# Patient Record
Sex: MALE | Race: WHITE
[De-identification: names, ages, dates, MRNs, and addresses within clinical notes are randomized per-mention and may not be internally consistent; named-entity substitution may affect disease eponyms.]

---

## 2018-08-29 ENCOUNTER — HOSPITAL ENCOUNTER (EMERGENCY)
Dept: HOSPITAL 74 - JER | Age: 38
LOS: 1 days | Discharge: HOME | End: 2018-08-30
Payer: COMMERCIAL

## 2018-08-29 VITALS — SYSTOLIC BLOOD PRESSURE: 132 MMHG | HEART RATE: 86 BPM | DIASTOLIC BLOOD PRESSURE: 82 MMHG | TEMPERATURE: 99 F

## 2018-08-29 VITALS — BODY MASS INDEX: 28.8 KG/M2

## 2018-08-29 DIAGNOSIS — N20.0: Primary | ICD-10-CM

## 2018-08-29 LAB
ALBUMIN SERPL-MCNC: 4.5 G/DL (ref 3.4–5)
ALP SERPL-CCNC: 78 U/L (ref 45–117)
ALT SERPL-CCNC: 51 U/L (ref 12–78)
ANION GAP SERPL CALC-SCNC: 9 MMOL/L (ref 8–16)
APPEARANCE UR: CLEAR
AST SERPL-CCNC: 32 U/L (ref 15–37)
BACTERIA #/AREA URNS HPF: (no result) /HPF
BASOPHILS # BLD: 0.5 % (ref 0–2)
BILIRUB SERPL-MCNC: 0.4 MG/DL (ref 0.2–1)
BILIRUB UR STRIP.AUTO-MCNC: NEGATIVE MG/DL
BUN SERPL-MCNC: 17 MG/DL (ref 7–18)
CALCIUM SERPL-MCNC: 9.1 MG/DL (ref 8.5–10.1)
CHLORIDE SERPL-SCNC: 105 MMOL/L (ref 98–107)
CO2 SERPL-SCNC: 28 MMOL/L (ref 21–32)
COLOR UR: YELLOW
CREAT SERPL-MCNC: 1.4 MG/DL (ref 0.7–1.3)
DEPRECATED RDW RBC AUTO: 13.5 % (ref 11.9–15.9)
EOSINOPHIL # BLD: 2.7 % (ref 0–4.5)
GLUCOSE SERPL-MCNC: 108 MG/DL (ref 74–106)
HCT VFR BLD CALC: 40.4 % (ref 35.4–49)
HGB BLD-MCNC: 13.7 GM/DL (ref 11.7–16.9)
INR BLD: 0.98 (ref 0.83–1.09)
KETONES UR QL STRIP: NEGATIVE
LEUKOCYTE ESTERASE UR QL STRIP.AUTO: NEGATIVE
LYMPHOCYTES # BLD: 15.3 % (ref 8–40)
MCH RBC QN AUTO: 33.8 PG (ref 25.7–33.7)
MCHC RBC AUTO-ENTMCNC: 33.9 G/DL (ref 32–35.9)
MCV RBC: 99.6 FL (ref 80–96)
MONOCYTES # BLD AUTO: 5.1 % (ref 3.8–10.2)
NEUTROPHILS # BLD: 76.4 % (ref 42.8–82.8)
NITRITE UR QL STRIP: NEGATIVE
PH UR: 5 [PH] (ref 5–8)
PLATELET # BLD AUTO: 285 K/MM3 (ref 134–434)
PMV BLD: 9.2 FL (ref 7.5–11.1)
POTASSIUM SERPLBLD-SCNC: 4.2 MMOL/L (ref 3.5–5.1)
PROT SERPL-MCNC: 8 G/DL (ref 6.4–8.2)
PROT UR QL STRIP: (no result)
PROT UR QL STRIP: NEGATIVE
PT PNL PPP: 11.1 SEC (ref 9.7–13)
RBC # BLD AUTO: 4.06 M/MM3 (ref 4–5.6)
SODIUM SERPL-SCNC: 142 MMOL/L (ref 136–145)
SP GR UR: 1.02 (ref 1–1.03)
UROBILINOGEN UR STRIP-MCNC: NEGATIVE MG/DL (ref 0.2–1)
WBC # BLD AUTO: 9.9 K/MM3 (ref 4–10)

## 2018-08-29 PROCEDURE — 3E033NZ INTRODUCTION OF ANALGESICS, HYPNOTICS, SEDATIVES INTO PERIPHERAL VEIN, PERCUTANEOUS APPROACH: ICD-10-PCS

## 2018-08-29 PROCEDURE — 3E0337Z INTRODUCTION OF ELECTROLYTIC AND WATER BALANCE SUBSTANCE INTO PERIPHERAL VEIN, PERCUTANEOUS APPROACH: ICD-10-PCS

## 2018-08-29 NOTE — PDOC
Attending Attestation





- HPI


HPI: 





08/29/18 20:26


The patient is a 37 year old male, with no significant past medical history, 

who presents to the emergency department with right upper quadrant pain after 

eating rice and beans this evening. He reports his pain associated with nausea 

and one episode of nonbloody emesis. 





The patient denies chest pain, shortness of breath, headache and dizziness. The 

patient denies fever, chills, diarrhea and constipation. The patient denies 

dysuria, frequency, urgency and hematuria. 





Allergies: NKDA


Past surgical history: none reported


Social history: denies tobacco and ETOH








- Physicial Exam


PE: 





08/29/18 22:18


GENERAL:


Well developed, well nourished. Awake and alert. No acute distress.


HEENT:


Normocephalic, atraumatic. PERRLA, EOMI. No conjunctival pallor. Sclera are non-

icteric. Moist mucous membranes. Oropharynx is clear.


NECK: 


Supple. Full ROM. No JVD. Carotid pulses 2+ and symmetric, without bruits. No 

thyromegaly. No lymphadenopathy.


CARDIOVASCULAR:


Regular rate and rhythm. No murmurs, rubs, or gallops. Distal pulses are 2+ and 

symmetric. 


PULMONARY: 


No evidence of respiratory distress. Lungs clear to auscultation bilaterally. 

No wheezing, rales or rhonchi.


ABDOMINAL:


Soft. Non-tender. Non-distended. No rebound or guarding. No organomegaly. 

Normoactive bowel sounds. 


MUSCULOSKELETAL 


Normal range of motion at all joints. No bony deformities or tenderness. No CVA 

tenderness.


EXTREMITIES: 


No cyanosis. No clubbing. No edema. No calf tenderness.


SKIN: 


Warm and dry. Normal capillary refill. No rashes. No jaundice. 


NEUROLOGICAL: 


Alert, awake, appropriate. Cranial nerves 2-12 intact. Normoreflexic in the 

upper and lower extremities. Normal speech. Toes are down-going bilaterally. 

Gait is normal without ataxia.


PSYCHIATRIC: 


Cooperative. Good eye contact. Appropriate mood and affect.





- Medical Decision Making


08/29/18 20:26


Documentation prepared by Fariba Avila, acting as medical scribe for Erik Valenzuela DO.





<Fariba Avila - Last Filed: 08/29/18 22:17>





- Resident


Resident Name: Curly Dubose





- ED Attending Attestation


I have performed the following: I have examined & evaluated the patient, The 

case was reviewed & discussed with the resident, I agree w/resident's findings 

& plan, Exceptions are as noted





- Medical Decision Making








08/29/18 23:39


Pt was treated and released





<Erik Valenzuela - Last Filed: 08/29/18 23:39>

## 2018-08-29 NOTE — PDOC
History of Present Illness





- General


Chief Complaint: Pain, Acute


Stated Complaint: PAIN, ACUTE


Time Seen by Provider: 08/29/18 18:58





- History of Present Illness


Initial Comments: 





08/29/18 20:54


The patient is a 37 year old male with no significant PMH who presents for 

evaluation of abdominal pain.  The patient reports experiencing RUQ crampy 

abdominal pain after eating earlier this evening with associated nausea and 1 

episode of non-bilious, non-bloody vomiting prompting his presentation to the 

ED for further evaluation.  He notes that his symptoms have somewhat improved 

on presentation to the ED, but otherwise denies fevers, chills, SOB, chest pain

, or changes with urination or bowel movements.





Past History





- Past Medical History


Allergies/Adverse Reactions: 


 Allergies











Allergy/AdvReac Type Severity Reaction Status Date / Time


 


No Known Allergies Allergy   Verified 08/29/18 18:59











Home Medications: 


Ambulatory Orders





No Home Medications 0 dose .ROUTE UTDICT 06/28/13 








COPD: No


Other medical history: DENIES.





- Suicide/Smoking/Psychosocial Hx


Smoking Status: Yes


Smoking History: Current some day smoker


Have you smoked in the past 12 months: Yes


Number of Cigarettes Smoked Daily: 5


Information on smoking cessation initiated: No





**Review of Systems





- Review of Systems


Comments:: 





08/29/18 20:57


Constitutional: No fevers, chills, fatigue, malaise


HEENT: No Rhinorrhea, nasal congestion, visual changes


Cardiovascular: No chest pain, syncope, palpitations, lightheadedness


Respiratory: No Cough, SOB, Hemoptysis,


Gastrointestinal: Abdominal pain, nausea, vomiting.  No Constipation, Diarrhea, 

Melena


Genitourinary: No Dysuria, Frequency, Urgency, Hesitancy, Hematuria, Flank pain


Musculoskeletal: No Myalgia, arthralgia


Skin: No rashes, itching, bruising, pallor


Neurologic: No Headache, Dizziness, Numbness, Weakness, or Tingling


Psychiatric: No Hallucinations. No SI or HI








*Physical Exam





- Vital Signs


 Last Vital Signs











Temp Pulse Resp BP Pulse Ox


 


 99 F   86   19   132/82   96 


 


 08/29/18 19:00  08/29/18 19:00  08/29/18 19:00  08/29/18 19:00  08/29/18 19:00














- Physical Exam


Comments: 





08/29/18 20:57


General Appearance: Nourished. No Apparent Distress


HEENT: No Pharyngeal Erythema, Tonsillar Exudate, Tonsillar Erythema


Neck: No Cervical Lymphadenopathy


Respiratory/Chest: Lungs Clear, Normal Breath Sounds. No Crackles, Rales, 

Rhonchi, Wheezing


Cardiovascular: Regular Rhythm, Regular Rate. No Murmur, Gallops, Rubs


Gastrointestinal/Abdominal: Normal Bowel Sounds, Soft. Mild RUQ tenderness to 

palpation on exam. No RLQ tenderness.  Negative Hernandez's sign.  No Guarding, 

Rebound, 


Musculoskeletal: No CVA Tenderness


Extremity: Normal Capillary Refill


Integumentary: Normal Color, Dry, Warm


Neurologic:  Fully Oriented, Alert, Normal Mood/Affect, Normal Response, 





ED Treatment Course





- LABORATORY


CBC & Chemistry Diagram: 


 08/29/18 19:37





 08/29/18 19:37





- ADDITIONAL ORDERS


Additional order review: 


 











  08/29/18





  19:37


 


RBC  4.06


 


MCV  99.6 H


 


MCHC  33.9


 


RDW  13.5


 


MPV  9.2


 


Neutrophils %  76.4  D


 


Lymphocytes %  15.3  D


 


Monocytes %  5.1


 


Eosinophils %  2.7


 


Basophils %  0.5














Medical Decision Making





- Medical Decision Making





08/29/18 20:58


The patient is a 37 year old male with no significant PMH who presents for 

evaluation of abdominal pain.  Differential includes but is not limited to: 

Cholecystitis, Pancreatitis, Infectious, Metabolic derangement.  Given the 

patient's history and physical exam, we will obtain a cbc, cmp, lipase, 

gallbladder US to evaluate further.  We will treat with iv fluids and iv 

tylenol and continue to monitor and reassess while here in the ED.





08/30/18 03:50


CBC, cmp, lipase are unremarkable.  Gallbladder US is unremarkable as read by 

our radiologist.  UA demonstrates elevated rbc.  Renal CT demonstrates 4mm 

kidney stone at the uretal-pelvic junction.  The patient's symptoms are likely 

due to a kidney stone.  He remains comfortable on exam.  We are comfortable 

discharging the patient home with urology follow up.  We discussed the results, 

plan, and return precautions with the patient who voiced understanding and is 

agreeable with the plan.





*DC/Admit/Observation/Transfer


Diagnosis at time of Disposition: 


 Kidney stone on right side





Abdominal pain


Qualifiers:


 Abdominal location: unspecified location Qualified Code(s): R10.9 - 

Unspecified abdominal pain








- Discharge Dispostion


Disposition: HOME


Condition at time of disposition: Stable


Decision to Admit order: No





- Referrals


Referrals: 


Chapin Paul MD., MD [Staff Physician] - 





- Patient Instructions


Printed Discharge Instructions:  DI for Kidney Stones


Additional Instructions: 


Please return to the ER if you experience concerning or worsening symptoms 

including worsening pain, vomiting or fevers.





Your lab results and CT scan show that you have a kidney stone.  Your kidney 

stone should pass on its own.  Please call to schedule a follow up appointment 

with our urologist within 2-3 days to discuss your ER visit and further 

management of your symptoms.





- Post Discharge Activity

## 2018-08-29 NOTE — PDOC
Rapid Medical Evaluation


Time Seen by Provider: 08/29/18 18:58


Medical Evaluation: 


 Allergies











Allergy/AdvReac Type Severity Reaction Status Date / Time


 


No Known Allergies Allergy   Verified 06/28/13 17:45











08/29/18 18:59


Pt presents with abdominal pain after eating. He states the pain is in his 

right side. Pain started an hour ago. Describes the pain as crampy. Admits to 

vomiting. Denies diarrhea, fevers.


Exam: TTP of the RLQ. No CVA tenderness


Orders: Labs, Urine, EKG, Iv insert


Pt to proceed to ED for further evaluation.





**Discharge Disposition





- Diagnosis


 Abdominal pain








- Referrals





- Patient Instructions





- Post Discharge Activity

## 2018-08-30 NOTE — EKG
Test Reason : 

Blood Pressure : ***/*** mmHG

Vent. Rate : 071 BPM     Atrial Rate : 071 BPM

   P-R Int : 162 ms          QRS Dur : 094 ms

    QT Int : 388 ms       P-R-T Axes : 060 054 039 degrees

   QTc Int : 421 ms

 

NORMAL SINUS RHYTHM WITH SINUS ARRHYTHMIA

NORMAL ECG

NO PREVIOUS ECGS AVAILABLE

Confirmed by FLETCHER BLANCAS, CLIFF ([id number]) on 8/30/2018 11:50:49 AM

 

Referred By:             Confirmed By:CLIFF BYNUM MD

## 2019-02-03 ENCOUNTER — HOSPITAL ENCOUNTER (EMERGENCY)
Dept: HOSPITAL 74 - FER | Age: 39
Discharge: HOME | End: 2019-02-03
Payer: COMMERCIAL

## 2019-02-03 VITALS — BODY MASS INDEX: 30.4 KG/M2

## 2019-02-03 VITALS — TEMPERATURE: 97.9 F | SYSTOLIC BLOOD PRESSURE: 138 MMHG | DIASTOLIC BLOOD PRESSURE: 79 MMHG | HEART RATE: 87 BPM

## 2019-02-03 DIAGNOSIS — F17.210: ICD-10-CM

## 2019-02-03 DIAGNOSIS — M54.5: Primary | ICD-10-CM

## 2019-02-03 PROCEDURE — 3E0233Z INTRODUCTION OF ANTI-INFLAMMATORY INTO MUSCLE, PERCUTANEOUS APPROACH: ICD-10-PCS | Performed by: EMERGENCY MEDICINE

## 2019-02-03 NOTE — PDOC
Attending Attestation





- Resident


Resident Name: Wilfredo Pennington





- ED Attending Attestation


I have performed the following: I have examined & evaluated the patient, The 

case was reviewed & discussed with the resident, I agree w/resident's findings 

& plan, Exceptions are as noted





- HPI


HPI: 





02/03/19 13:37


39 yo male with low back pain. started few days ago after lifting heavy object. 

no urinary complaints. no new weakness or numbness. no bowel or bladder 

incontinence.   no fever or chills. no urinary  complaints. pain lower back 

radiating to left . does not go down leg. worse wtih movement and bending.


02/03/19 14:06








- Physicial Exam


PE: 





02/03/19 14:09


awake alert lungs clear bilaterally heart rrr no mrg abd soft nt nd. ext wwp no 

edema. no mildline spinal tenderness. paraspinal lumbosacral spasm ,reginaldo left 

side. 5/5 lower ext strength with hip flex/ext, knee flex / ext. sensation 

intact lower ext. 





- Medical Decision Making





02/03/19 14:09


pt with low back strain, normal nuerological exam. no mildline tendernss. will 

treat with muscle relaxer and nsaid. told to followup with pcp for outpt pt .

## 2019-02-03 NOTE — PDOC
History of Present Illness





- General


Chief Complaint: Bone Injury


Stated Complaint: BACK PAIN


Time Seen by Provider: 02/03/19 13:12


History Source: Patient


Exam Limitations: No Limitations





- History of Present Illness


Initial Comments: 





02/03/19 13:30


Patient is a 38M with no significant medical history here today complaining of 

back pain that onset yesterday during heavy lifting. Patient took motrin with 

some relief yesterday but the pain returned today causing him to come into the 

ED. Denies fevers, chills, nausea, vomiting. Denies urinary symptoms, saddle 

anesthesia, foot drop, leg weakness, arm weakness, IDVA, cancer history. Denies 

chest pain, shortness of breath.





Past History





- Past Medical History


Allergies/Adverse Reactions: 


 Allergies











Allergy/AdvReac Type Severity Reaction Status Date / Time


 


No Known Allergies Allergy   Verified 02/03/19 13:06











Home Medications: 


Ambulatory Orders





Ibuprofen 400 mg PO PRN 02/03/19 


Lidocaine 5% Patch [Lidoderm -] 1 patch TP DAILY #7 patch 02/03/19 








COPD: No





- Immunization History


Immunization Up to Date: No





- Suicide/Smoking/Psychosocial Hx


Smoking Status: Yes


Smoking History: Current every day smoker


Have you smoked in the past 12 months: Yes


Number of Cigarettes Smoked Daily: 5


Information on smoking cessation initiated: Yes


Hx Alcohol Use: No


Drug/Substance Use Hx: No





**Review of Systems





- Review of Systems


Able to Perform ROS?: Yes


Comments:: 





02/03/19 13:32


GENERAL/CONSTITUTIONAL: No fever or chills. No weakness.


HEAD, EYES, EARS, NOSE AND THROAT: No change in vision. No sore throat.


CARDIOVASCULAR: No chest pain or shortness of breath


RESPIRATORY: No cough, wheezing, or hemoptysis.


GASTROINTESTINAL: No nausea, vomiting, diarrhea or constipation.


GENITOURINARY: No dysuria, frequency, or change in urination.


MUSCULOSKELETAL: No joint or muscle swelling or pain. No neck pain +back pain.


SKIN: No rash


NEUROLOGIC: No headache, vertigo, loss of consciousness, or change in strength/

sensation.








*Physical Exam





- Vital Signs


 Last Vital Signs











Temp Pulse Resp BP Pulse Ox


 


 97.9 F   87   20   138/79   97 


 


 02/03/19 13:05  02/03/19 13:05  02/03/19 13:05  02/03/19 13:05  02/03/19 13:05














- Physical Exam


Comments: 





02/03/19 13:38


GENERAL: Awake, alert, and fully oriented, in no acute distress


HEAD: No signs of trauma, normocephalic, atraumatic


BACK: No midline tenderness, +L lower lateral back pain with muscle spasm


EYES: PERRLA, EOMI, sclera anicteric, conjunctiva clear


ENT: Auricles normal inspection, hearing grossly normal, nares patent, 

oropharynx clear without


exudates. Moist mucosa


NECK: Normal ROM, supple, no lymphadenopathy, JVD, or masses


LUNGS: No distress, speaks full sentences, clear to auscultation bilaterally


HEART: Regular rate and rhythm, normal S1 and S2, no murmurs, rubs or gallops, 

peripheral pulses normal and equal bilaterally.


ABDOMEN: Soft, nontender, normoactive bowel sounds.  No guarding, no rebound.  

No masses


EXTREMITIES: Normal inspection, Normal range of motion, no edema.  No clubbing 

or cyanosis.


NEUROLOGICAL: Cranial nerves II through XII grossly intact.  Normal speech, 

normal gait, no focal sensorimotor deficits


SKIN: Warm, Dry, normal turgor, no rashes or lesions noted.








Moderate Sedation





- Procedure Monitoring


Vital Signs: 


Procedure Monitoring Vital Signs











Temperature  97.9 F   02/03/19 13:05


 


Pulse Rate  87   02/03/19 13:05


 


Respiratory Rate  20   02/03/19 13:05


 


Blood Pressure  138/79   02/03/19 13:05


 


O2 Sat by Pulse Oximetry (%)  97   02/03/19 13:05











ED Treatment Course





- Medications


Given in the ED: 


ED Medications














Discontinued Medications














Generic Name Dose Route Start Last Admin





  Trade Name Abimbola  PRN Reason Stop Dose Admin


 


Diazepam  5 mg  02/03/19 13:12  02/03/19 13:20





  Valium -  PO  02/03/19 13:13  5 mg





  ONCE ONE   Administration





     





     





     





     


 


Ketorolac Tromethamine  60 mg  02/03/19 13:12  02/03/19 13:22





  Toradol Injection -  IM  02/03/19 13:13  60 mg





  ONCE ONE   Administration





     





     





     





     


 


Lidocaine  1 patch  02/03/19 13:12  02/03/19 13:20





  Lidoderm Patch -  TP  02/03/19 13:13  1 patch





  ONCE ONE   Administration





     





     





     





     














Medical Decision Making





- Medical Decision Making





02/03/19 13:39


Patient is 38M here today with back pain. No red flags. Will treat with valium, 

toradol, lidocaine patches. Will reassess, likely discharge.


02/03/19 17:55


Patient improved soon after medication. Discharged. Given return precautions.








*DC/Admit/Observation/Transfer


Diagnosis at time of Disposition: 


 Back pain








- Discharge Dispostion


Disposition: HOME


Condition at time of disposition: Good


Decision to Admit order: No





- Prescriptions


Prescriptions: 


Lidocaine 5% Patch [Lidoderm -] 1 patch TP DAILY #7 patch





- Referrals





- Patient Instructions


Printed Discharge Instructions:  DI for Thoracic Back Pain


Additional Instructions: 


Please follow up with your primary care doctor this week.





Please return if you have any new, worsening or concerning symptoms, especially 

fevers, increasing pain and leg/foot weakness.





- Post Discharge Activity

## 2019-03-04 ENCOUNTER — HOSPITAL ENCOUNTER (EMERGENCY)
Dept: HOSPITAL 74 - FER | Age: 39
Discharge: HOME | End: 2019-03-04
Payer: COMMERCIAL

## 2019-03-04 VITALS — TEMPERATURE: 98.8 F | HEART RATE: 97 BPM | DIASTOLIC BLOOD PRESSURE: 80 MMHG | SYSTOLIC BLOOD PRESSURE: 133 MMHG

## 2019-03-04 VITALS — BODY MASS INDEX: 29.5 KG/M2

## 2019-03-04 DIAGNOSIS — M54.5: Primary | ICD-10-CM

## 2019-03-04 DIAGNOSIS — F17.210: ICD-10-CM

## 2019-03-04 LAB
ALBUMIN SERPL-MCNC: 4.5 G/DL (ref 3.4–5)
ALP SERPL-CCNC: 72 U/L (ref 45–117)
ALT SERPL-CCNC: 46 U/L (ref 13–61)
ANION GAP SERPL CALC-SCNC: 10 MMOL/L (ref 8–16)
AST SERPL-CCNC: 35 U/L (ref 15–37)
BASOPHILS # BLD: 1.3 % (ref 0–2)
BILIRUB SERPL-MCNC: 1.4 MG/DL (ref 0.2–1)
BUN SERPL-MCNC: 13 MG/DL (ref 7–18)
CALCIUM SERPL-MCNC: 9.5 MG/DL (ref 8.5–10)
CHLORIDE SERPL-SCNC: 100 MMOL/L (ref 98–107)
CO2 SERPL-SCNC: 24 MMOL/L (ref 21–32)
CREAT SERPL-MCNC: 1 MG/DL (ref 0.55–1.3)
DEPRECATED RDW RBC AUTO: 14.4 % (ref 11.9–15.9)
EOSINOPHIL # BLD: 5.5 % (ref 0–4.5)
EPITH CASTS URNS QL MICRO: (no result) /HPF
GLUCOSE SERPL-MCNC: 108 MG/DL (ref 74–106)
HCT VFR BLD CALC: 42 % (ref 35.4–49)
HGB BLD-MCNC: 13.5 GM/DL (ref 11.7–16.9)
LYMPHOCYTES # BLD: 26.6 % (ref 8–40)
MCH RBC QN AUTO: 33.3 PG (ref 25.7–33.7)
MCHC RBC AUTO-ENTMCNC: 32.1 G/DL (ref 32–35.9)
MCV RBC: 103.8 FL (ref 80–96)
MONOCYTES # BLD AUTO: 4.6 % (ref 3.8–10.2)
NEUTROPHILS # BLD: 62 % (ref 42.8–82.8)
PH UR: 5.5 [PH] (ref 4.5–8)
PLATELET # BLD AUTO: 335 K/MM3 (ref 134–434)
PMV BLD: 9.4 FL (ref 7.5–11.1)
POTASSIUM SERPLBLD-SCNC: 4.3 MMOL/L (ref 3.5–5.1)
PROT SERPL-MCNC: 7.7 G/DL (ref 6.4–8.2)
RBC # BLD AUTO: (no result) /HPF (ref 0–3)
RBC # BLD AUTO: 4.05 M/MM3 (ref 4–5.6)
SODIUM SERPL-SCNC: 134 MMOL/L (ref 136–145)
SP GR UR: 1.02 (ref 1.01–1.03)
SPERM # UR AUTO: (no result) /UL
UROBILINOGEN UR STRIP-MCNC: 0.2 MG/DL (ref 0.2–1)
WBC # BLD AUTO: 7.7 K/MM3 (ref 4–10.8)
WBC # UR AUTO: (no result) /UL (ref 0–2)

## 2019-03-04 PROCEDURE — 3E0233Z INTRODUCTION OF ANTI-INFLAMMATORY INTO MUSCLE, PERCUTANEOUS APPROACH: ICD-10-PCS | Performed by: EMERGENCY MEDICINE

## 2019-03-04 NOTE — PDOC
History of Present Illness





- General


Chief Complaint: Back Pain


Stated Complaint: BACK PAIN


Time Seen by Provider: 03/04/19 12:16


History Source: Patient


Exam Limitations: No Limitations





- History of Present Illness


Initial Comments: 





03/04/19 12:38


The patient is a 38M with no PMH who presents to the ER with complaints of low 

back pain. The patient states that 1 month ago, he was diagnosed with MSK low 

back pain. 1 week ago, he bent down to pick something up and felt a worsening 

of his low back pain. Today, he states that his "kidneys feel full". He denies 

numbness, tingling, weakness, fever, chills, bowel/bladder incontinence, hx of 

IVDA, and hx of cancer.





Past History





- Past Medical History


Allergies/Adverse Reactions: 


 Allergies











Allergy/AdvReac Type Severity Reaction Status Date / Time


 


No Known Allergies Allergy   Verified 03/04/19 12:15











Home Medications: 


Ambulatory Orders





NK [No Known Home Medication]  03/04/19 








COPD: No


Kidney Stones: Yes





- Immunization History


Immunization Up to Date: No





- Suicide/Smoking/Psychosocial Hx


Smoking Status: Yes


Smoking History: Current every day smoker


Have you smoked in the past 12 months: Yes


Number of Cigarettes Smoked Daily: 5


Information on smoking cessation initiated: Yes


'Breaking Loose' booklet given: 02/03/19


Hx Alcohol Use:  (occasional)


Drug/Substance Use Hx: No





**Review of Systems





- Review of Systems


Able to Perform ROS?: Yes


Comments:: 





03/04/19 12:49


GENERAL/CONSTITUTIONAL: No fever or chills. No weakness.


HEAD, EYES, EARS, NOSE AND THROAT: No change in vision. No ear pain or 

discharge. No sore throat.


CARDIOVASCULAR: No chest pain, palpitations, or lightheadedness.


RESPIRATORY: No cough, wheezing, shortness of breath, or hemoptysis.


GASTROINTESTINAL: No nausea, vomiting, diarrhea, constipation, or abdominal 

pain. 


GENITOURINARY: No dysuria, frequency, hematuria, or change in urination.


MUSCULOSKELETAL: + for low back pain. No joint or muscle swelling or pain. 


SKIN: No rash or lesions. 


NEUROLOGIC: No headache, numbness, tingling, focal weakness, loss of 

consciousness, or change in strength/sensation.





Is the patient limited English proficient: No





*Physical Exam





- Vital Signs


 Last Vital Signs











Temp Pulse Resp BP Pulse Ox


 


 98.8 F   97 H  18   133/80    


 


 03/04/19 12:14  03/04/19 12:14  03/04/19 12:14  03/04/19 12:14   














- Physical Exam


Comments: 





03/04/19 12:49


GENERAL: Well developed, well nourished. Awake and alert. No acute distress.


HEENT: Normocephalic, atraumatic. Hearing grossly normal. Moist mucous 

membranes. PERRLA, EOMI. No conjunctival pallor. Sclera are non-icteric. 

Oropharynx is clear.


NECK: Supple. Full ROM. No JVD. 


GENITOURINARY: No CVA tenderness bilaterally.


MUSCULOSKELETAL: Normal range of motion at all joints. No bony deformities or 

tenderness. 


EXTREMITIES: No cyanosis. No clubbing. No edema. No calf tenderness or swelling.


SKIN: Warm and dry. Normal capillary refill. No rashes. No jaundice. 


NEUROLOGICAL: Alert, awake, appropriate. Cranial nerves 2-12 grossly intact. No 

deficits to light touch and temperature in lower extremities. 5/5 strength in 

quadriceps, hamstrings, and gastrocnemius. Normoreflexic in the upper and lower 

extremities. Normal speech. Gait is normal without ataxia.


PSYCHIATRIC: Cooperative. Good eye contact. Appropriate mood and affect.








Moderate Sedation





- Procedure Monitoring


Vital Signs: 


Procedure Monitoring Vital Signs











Temperature  98.8 F   03/04/19 12:14


 


Pulse Rate  97 H  03/04/19 12:14


 


Respiratory Rate  18   03/04/19 12:14


 


Blood Pressure  133/80   03/04/19 12:14


 


O2 Sat by Pulse Oximetry (%)      











ED Treatment Course





- LABORATORY


CBC & Chemistry Diagram: 


 03/04/19 12:45





 03/04/19 12:45





- ADDITIONAL ORDERS


Additional order review: 


 Laboratory  Results











  03/04/19





  12:25


 


Urine Color  Yellow


 


Urine Appearance  Clear


 


Urine pH  5.5


 


Ur Specific Gravity  1.025


 


Urine Protein  Trace


 


Urine Glucose (UA)  Negative


 


Urine Ketones  Negative


 


Urine Blood  Trace-intact H


 


Urine Nitrite  Negative


 


Urine Bilirubin  Negative


 


Urine Urobilinogen  0.2


 


Ur Leukocyte Esterase  Negative














- Medications


Given in the ED: 


ED Medications














Discontinued Medications














Generic Name Dose Route Start Last Admin





  Trade Name Freq  PRN Reason Stop Dose Admin


 


Ketorolac Tromethamine  60 mg  03/04/19 12:29  03/04/19 12:35





  Toradol Injection -  IM  03/04/19 12:30  60 mg





  ONCE ONE   Administration





     





     





     





     














Medical Decision Making





- Medical Decision Making





03/04/19 12:49


The patient is a 38M with no PMH who presents with worsening low back pain and 

"kidney fullness". Will order UA and treat w/ toradol. UA shows trace RBC's. 

Will order labs including Cr and CK to evaluate for kidney function. PE 

unremarkable with no CVA tenderness and no midline or paraspinal tenderness.





03/04/19 13:29


Labs WNL. Will give referral and outpt meds for back pain w/ PCP, ortho, and 

nephro f/u.





*DC/Admit/Observation/Transfer


Diagnosis at time of Disposition: 


Back pain


Qualifiers:


 Back pain location: low back pain Chronicity: unspecified Back pain laterality

: bilateral Sciatica presence: without sciatica Qualified Code(s): M54.5 - Low 

back pain








- Discharge Dispostion


Disposition: HOME


Condition at time of disposition: Stable


Decision to Admit order: No





- Referrals


Referrals: 


[facility] [place] [Provider Group]


Cameron May MD [Staff Physician] - 


Elsa Llamas MD [Staff Physician] - 





- Patient Instructions


Printed Discharge Instructions:  DI for Low Back Pain


Additional Instructions: 


Your ER visit is not complete until your follow up with your primary care 

physician. Please follow up with your primary care physician in 1-2 days. 





We have sent you a few referral:


The first is for a primary care doctor with [facility]. Call them to make an 

appointment. 





Dr. May is an orthopedic doctor who can see you for your back pain. 





You were also found to have a small amount of blood in your urine. Please see a 

kidney doctor, Dr. Llamas, for further evaluation.





Please return to the ER if you have any signs or symptoms of chest pain, 

shortness of breath, uncontrollable fever, chills, nausea, vomiting, numbness, 

tingling, or weakness in any part of your body, changes in vision, or slurred 

speech.





Please take your medications as prescribed.





Please return to the ER if symptoms persist, worsen, or new symptoms arise.








- Post Discharge Activity

## 2019-03-04 NOTE — PDOC
Attending Attestation





- Resident


Resident Name: Alonso Gallardo





- ED Attending Attestation


I have performed the following: I have examined & evaluated the patient, The 

case was reviewed & discussed with the resident, I agree w/resident's findings 

& plan, Exceptions are as noted





- HPI


HPI: 





03/04/19 13:39


Reviewed Residents HPI





- Physicial Exam


PE: 





03/04/19 13:39








Vitals: Triage Vital signs reviewed  


General Appearance:  no acute distress, well nourished well developed, 


Head: Atraumatic, 


Neck:  Supple;No Nucal rigidity


Chest Wall: Nontender


Cardiac: Regular rate and rhythym, no murmurs, no rubs, no gallops, 


Lungs: Clear to auscultation bilateral, good air movement bilaterally,


Abdomen:  Soft, non distended, normal bowel sounds, non tender to palpation


Extremities: Full range of motion to all extremities, no cyanosis, clubbing, or 

edema


Musculoskeletal: No cervical thoracic or lumbar midline tenderness reproducible 

paraspinal lower back tenderness to palpation.


Skin:  Warm and dry, no rashes or lesions, no rash, no petechiae


Neuro: AOX3; Cranial Nerves 2-12 grossly intact, Strength intact to all 

extremities, Sensation intact to all extremities,gait normal


Psych:  normal mood, normal affect








- Medical Decision Making





03/04/19 13:41





Pt. with low back discomfort seems have been exacerbated by movements and 

lifting patient does construction work for a living





Laboratory and urinalysis notable for trace positive blood. Patient informed of 

these results and instructed to follow up with provided the patient with a new 

PCP as well as our clinic information and instructed him to follow up within 1 

week





We will place patient on Medrol Dosepak as well as Flexeril he was also 

provided with orthopedic follow-up in case no improvement in low back 

discomfort within 1 week





Findings, need for follow-up and strict return instructions discussed with 

patient.

## 2019-03-08 ENCOUNTER — HOSPITAL ENCOUNTER (EMERGENCY)
Dept: HOSPITAL 74 - FER | Age: 39
Discharge: HOME | End: 2019-03-08
Payer: COMMERCIAL

## 2019-03-08 VITALS — SYSTOLIC BLOOD PRESSURE: 142 MMHG | TEMPERATURE: 97.5 F | DIASTOLIC BLOOD PRESSURE: 93 MMHG | HEART RATE: 88 BPM

## 2019-03-08 VITALS — BODY MASS INDEX: 29.5 KG/M2

## 2019-03-08 DIAGNOSIS — F17.210: ICD-10-CM

## 2019-03-08 DIAGNOSIS — M54.5: Primary | ICD-10-CM

## 2019-03-08 DIAGNOSIS — M54.16: ICD-10-CM

## 2019-03-08 NOTE — PDOC
History of Present Illness





- History of Present Illness


Initial Comments: 


This patient is a 38 year old male, who was seen 5 weeks ago for lower back pain

, seen again 4 days ago for the same lower back pain. He was given a shot of 

toradol and a medrol pack. He returns today complaining of  numbness, weakness, 

and tingling down his right leg. He states that he became worried when he couldn

't raise his big toe of his right foot. He also statesHe reports that it is 

difficult to sit due to the pain in his right buttox (near the site of his 

injection from Monday).  He states that his lower back pain has somewhat 

improved. 





No bowel or bladder incontinence, no difficulty urinating. 





PCP: reffered to [facility]








<Eli Regalado - Last Filed: 03/08/19 17:18>





- General


History Source: Patient


Exam Limitations: No Limitations





<Esther Osorio - Last Filed: 03/08/19 17:58>





- General


Chief Complaint: Pain


Stated Complaint: RIGHT LEG NUMBNESS


Time Seen by Provider: 03/08/19 15:53





Past History





<Eli Regalado - Last Filed: 03/08/19 17:18>





- Past Medical History


COPD: No


Kidney Stones: Yes





- Immunization History


Immunization Up to Date: No





- Suicide/Smoking/Psychosocial Hx


Smoking Status: Yes


Smoking History: Current every day smoker


Have you smoked in the past 12 months: Yes


Number of Cigarettes Smoked Daily: 5


Information on smoking cessation initiated: No


'Breaking Loose' booklet given: 03/04/19


Hx Alcohol Use: No


Drug/Substance Use Hx: No





<Esther Osorio - Last Filed: 03/08/19 17:58>





- Past Medical History


Allergies/Adverse Reactions: 


 Allergies











Allergy/AdvReac Type Severity Reaction Status Date / Time


 


No Known Allergies Allergy   Verified 03/08/19 15:42











Home Medications: 


Ambulatory Orders





Cyclobenzaprine HCl [Flexeril -] 10 mg PO TID PRN #21 tablet 03/04/19 


Methylprednisolone [Medrol Dose Chaz] 4 mg PO ASDIR #21 tablet 03/04/19 











**Review of Systems





- Review of Systems


Comments:: 


GENERAL/CONSTITUTIONAL: No: fever, chills, weakness, loss of appetite.


HEAD, EYES, EARS, NOSE AND THROAT: No: change in vision, ear pain, discharge, 

sore throat, throat swelling.


CARDIOVASCULAR: No: chest pain, lightheadedness, palpitations, syncope


RESPIRATORY: No: cough, shortness of breath, wheezing, hemoptysis, stridor.


GASTROINTESTINAL: No: nausea, vomiting, abdominal cramping, diarrhea, rectal 

bleeding, constipation. 


GENITOURINARY: No: dysuria, hematuria, frequency, urgency, flank pain.


MUSCULOSKELETAL: +right leg numbess, tingling, pain. +right buttox pain and 

numbness.


SKIN: No: lesions, pallor, rash or easy bruising.


NEUROLOGIC: +right leg numbess, tingling, pain, weakness. No: headache, 

vertigo. 


ENDOCRINE: No: unexplained weight gain or loss


HEMATOLOGIC/LYMPHATIC: No: anemia, easy bleeding, swelling nodes





03/08/19 17:19








<Eli Regalado - Last Filed: 03/08/19 17:18>





*Physical Exam





- Vital Signs


 Last Vital Signs











Temp Pulse Resp BP Pulse Ox


 


 97.5 F L  88   19   142/93   99 


 


 03/08/19 15:42  03/08/19 15:42  03/08/19 15:42  03/08/19 15:42  03/08/19 15:42














<Eli Regalado - Last Filed: 03/08/19 17:18>





- Vital Signs


 Last Vital Signs











Temp Pulse Resp BP Pulse Ox


 


 97.5 F L  88   19   142/93   99 


 


 03/08/19 15:42  03/08/19 15:42  03/08/19 15:42  03/08/19 15:42  03/08/19 15:42














<Esther Osorio - Last Filed: 03/08/19 17:58>





Moderate Sedation





- Procedure Monitoring


Vital Signs: 


Procedure Monitoring Vital Signs











Temperature  97.5 F L  03/08/19 15:42


 


Pulse Rate  88   03/08/19 15:42


 


Respiratory Rate  19 03/08/19 15:42


 


Blood Pressure  142/93   03/08/19 15:42


 


O2 Sat by Pulse Oximetry (%)  99   03/08/19 15:42











<Eli Regalado - Last Filed: 03/08/19 17:18>





- Procedure Monitoring


Vital Signs: 


Procedure Monitoring Vital Signs











Temperature  97.5 F L  03/08/19 15:42


 


Pulse Rate  88   03/08/19 15:42


 


Respiratory Rate  19   03/08/19 15:42


 


Blood Pressure  142/93   03/08/19 15:42


 


O2 Sat by Pulse Oximetry (%)  99   03/08/19 15:42











<Esther Osorio - Last Filed: 03/08/19 17:58>





Medical Decision Making





- Medical Decision Making





03/08/19 17:41


37 yo M presenting to the ER for assessment of leg pain and numbness


Pt states he threw out his back several weeks ago, got a toradol shot on the 

left side


He has since followed up with [facility] for PT and had an MRI that showed disc 

herniation/inflammation (per patient)


He returned 4 days ago


Was given another toradol shot in the right buttock


Since then, he has noted pain radiating down the leg, numbness, paresthesias





Pt concerned because now his back pain has resolved but he has radicular 

symptoms


No fevers or chills


Call placed to Dr Hodgson who is willing to see this patient in the office


Recommends trying Neurontin 300mg po qhs





I have explained this to the patient


He is still frustrated


Nursing supervisor contacted








<Esther Osorio - Last Filed: 03/08/19 17:58>





*DC/Admit/Observation/Transfer





- Attestations


Scribe Attestion: 





03/08/19 17:21





Documentation prepared by Eli Regalado, acting as medical scribe for 

Esther Osorio MD.





<Eli Regalado - Last Filed: 03/08/19 17:18>





- Discharge Dispostion


Decision to Admit order: No





<Esther Osorio - Last Filed: 03/08/19 17:58>


Diagnosis at time of Disposition: 


 Acute low back pain with radicular symptoms, duration less than 6 weeks








- Discharge Dispostion


Condition at time of disposition: Stable





- Patient Instructions


Printed Discharge Instructions:  DI for Lumbar Radiculopathy


Additional Instructions: 


Thank you for coming in to the ER today


Please be sure to follow up with the Neurologist as we discussed


Please try the medication I prescribed


Please return to the ER for any other concerns or complaints


You can either follow up with the Neurologist or with your [facility] doctor





- Post Discharge Activity


Forms/Work/School Notes:  Back to Work

## 2020-09-29 ENCOUNTER — HOSPITAL ENCOUNTER (EMERGENCY)
Dept: HOSPITAL 74 - JER | Age: 40
Discharge: HOME | End: 2020-09-29
Payer: COMMERCIAL

## 2020-09-29 VITALS — TEMPERATURE: 97.4 F | SYSTOLIC BLOOD PRESSURE: 120 MMHG | DIASTOLIC BLOOD PRESSURE: 85 MMHG | HEART RATE: 95 BPM

## 2020-09-29 VITALS — BODY MASS INDEX: 27.4 KG/M2

## 2020-09-29 DIAGNOSIS — R05: Primary | ICD-10-CM

## 2020-09-29 NOTE — PDOC
Rapid Medical Evaluation


Chief Complaint: Cold Symptoms


Time Seen by Provider: 09/29/20 12:51


Medical Evaluation: 


                                    Allergies











Allergy/AdvReac Type Severity Reaction Status Date / Time


 


No Known Allergies Allergy   Verified 03/08/19 15:42








Vital Signs











Temp Pulse Resp BP Pulse Ox


 


 97.4 F L  95 H  18   120/85   100 


 


 09/29/20 12:50  09/29/20 12:50  09/29/20 12:50  09/29/20 12:50  09/29/20 12:50








09/29/20 12:57





HPI:


COVID-19 CDC guideline data points:


 


The patient is a 38yo M presents with suspected COVID-19 with associated 

symptoms of dry cough, SOB, anorexia, complicated by this/these comorbidities: 

none.


 





ROS:


NEGATIVE:  difficulty breathing, shortness of breath, chest pain, 

lightheadedness, dizziness, nausea, vomiting and diarrhea.  Other 12 point ROS 

reviewed and negative.





Exam:


General: NAD, Well-Appearing, Awake, Alert Oriented x3.  Vital signs stable.


ENT: No rhinorrhea or nasal congestion.


Neck: FROM, no midline tenderness.


Lungs: Clear to auscultation bilaterally without wheezes, rhonchi or rales.  

Normal excursion.  Patient is able to speak in full sentences.


Heart: HR: 95. Regular rhythm, S1-S2 present, no murmurs rubs or gallops.


Abdomen: Non-distended.


MSK/Extremities: No decrease ROM, No obvious deformities.  No obvious cyanosis 

noted.


Neuro: Normal Gait, Cranial Nerves II through XII Grossly Intact.


Skin: No obvious rashes, bruising.  Color Normal Appearing.





Assessment/Plan: Cough/fever


Patient has a history of this/these comorbidities: none, denies recent travel 

and known COVID exposure.  


Patient has been tested for Covid and results are pending.








 


ASSESSMENT:


Denies recent travel and known Covid exposure. 





Treatment:


CXR


Reassess


Anticipate d/c





09/29/20 14:19


CXR as read by me: Angles sharp. Cardiac size is WNL. Increased lung marking to 

right base. No focal infiltrates or consolidations noted.





Discharge to social distancing.





**Discharge Disposition





- Diagnosis


 Cough, Suspected 2019 novel coronavirus infection








- Discharge Dispostion


Disposition: HOME


Condition at time of disposition: Stable


Decision to Admit order: No





- Referrals


Referrals: 


Vinita Andrews MD [Primary Care Provider] - 





- Patient Instructions


Printed Discharge Instructions:  [facility]-Coronavirus Instructions, [facility] COVID-19 Isolation Protocol





- Post Discharge Activity


Work/School Note:  Back to Work